# Patient Record
(demographics unavailable — no encounter records)

---

## 2025-01-14 NOTE — HISTORY OF PRESENT ILLNESS
[de-identified] : Patient here for evaluation right knee pain. Denies instability Injured knee, posterior knee pain  NAD Right knee No skin breakdown ttp posterior knee Negative lachman Negative varus/valgus instability ROM 0-120 Pain with forced extension and flexion NVI Compartments soft and NT  mri right knee posteiror knee strain  Plan went ove findings explained the imaging and tx options kim start pt mobic sent for pain